# Patient Record
Sex: FEMALE | Race: WHITE | NOT HISPANIC OR LATINO | Employment: STUDENT | URBAN - METROPOLITAN AREA
[De-identification: names, ages, dates, MRNs, and addresses within clinical notes are randomized per-mention and may not be internally consistent; named-entity substitution may affect disease eponyms.]

---

## 2017-01-06 ENCOUNTER — GENERIC CONVERSION - ENCOUNTER (OUTPATIENT)
Dept: OTHER | Facility: OTHER | Age: 22
End: 2017-01-06

## 2017-01-20 ENCOUNTER — ALLSCRIPTS OFFICE VISIT (OUTPATIENT)
Dept: OTHER | Facility: OTHER | Age: 22
End: 2017-01-20

## 2017-01-20 DIAGNOSIS — D35.2 BENIGN NEOPLASM OF PITUITARY GLAND (HCC): ICD-10-CM

## 2017-04-10 ENCOUNTER — ALLSCRIPTS OFFICE VISIT (OUTPATIENT)
Dept: OTHER | Facility: OTHER | Age: 22
End: 2017-04-10

## 2017-04-10 DIAGNOSIS — E23.7 DISORDER OF PITUITARY GLAND (HCC): ICD-10-CM

## 2017-04-10 DIAGNOSIS — G50.0 TRIGEMINAL NEURALGIA: ICD-10-CM

## 2017-04-10 DIAGNOSIS — H57.11 PAIN OF RIGHT EYE: ICD-10-CM

## 2017-04-10 DIAGNOSIS — R20.0 ANESTHESIA OF SKIN: ICD-10-CM

## 2017-05-01 ENCOUNTER — HOSPITAL ENCOUNTER (OUTPATIENT)
Dept: RADIOLOGY | Facility: HOSPITAL | Age: 22
Discharge: HOME | End: 2017-05-01
Attending: PSYCHIATRY & NEUROLOGY
Payer: COMMERCIAL

## 2017-05-01 DIAGNOSIS — H57.11 PAIN OF RIGHT EYE: ICD-10-CM

## 2017-05-01 DIAGNOSIS — R20.0 ANESTHESIA OF SKIN: ICD-10-CM

## 2017-05-01 DIAGNOSIS — E23.7 DISORDER OF PITUITARY GLAND (HCC): ICD-10-CM

## 2017-05-01 DIAGNOSIS — G50.0 TRIGEMINAL NEURALGIA: ICD-10-CM

## 2017-05-01 PROCEDURE — 70544 MR ANGIOGRAPHY HEAD W/O DYE: CPT

## 2017-05-02 ENCOUNTER — GENERIC CONVERSION - ENCOUNTER (OUTPATIENT)
Dept: OTHER | Facility: OTHER | Age: 22
End: 2017-05-02

## 2017-06-29 ENCOUNTER — HOSPITAL ENCOUNTER (OUTPATIENT)
Dept: RADIOLOGY | Facility: HOSPITAL | Age: 22
Discharge: HOME | End: 2017-06-29
Payer: COMMERCIAL

## 2017-06-29 DIAGNOSIS — D35.2 BENIGN NEOPLASM OF PITUITARY GLAND (HCC): ICD-10-CM

## 2017-06-29 PROCEDURE — 70553 MRI BRAIN STEM W/O & W/DYE: CPT

## 2017-06-29 PROCEDURE — A9585 GADOBUTROL INJECTION: HCPCS | Performed by: RADIOLOGY

## 2017-06-29 RX ADMIN — GADOBUTROL 8 ML: 604.72 INJECTION INTRAVENOUS at 22:25

## 2017-07-03 DIAGNOSIS — D35.2 BENIGN NEOPLASM OF PITUITARY GLAND (HCC): ICD-10-CM

## 2018-01-11 NOTE — RESULT NOTES
Verified Results  * MRA AND OR MRV HEAD WO CONTRAST [id number] 08:04PM Silvina Lopez Order Number: [id number]    - Patient Instructions: To schedule this appointment, please contact Central Scheduling at [phone]  Test Name Result Flag Reference   MRA HEAD WO CONTRAST (Report)     This is a summary report  The complete report is available in the patient's medical record  If you cannot access the medical record, please contact the sending organization for a detailed fax or copy  MRA BRAIN     INDICATION: Trigeminal neuralgia on the right     COMPARISON: None  TECHNIQUE: Axial 3-D time-of-flight imaging with 3-D reconstructions  FINDINGS:     IMAGE QUALITY: Diagnostic  ANATOMY near 1 cm pituitary mass consistent with adenoma  INTERNAL CAROTID ARTERIES: Normal flow related enhancement of the distal cervical, petrous and cavernous segments of the internal carotid arteries  Normal ICA terminus  ANTERIOR CIRCULATION: Normal A1 segments  Normal anterior communicating artery  Normal flow-related enhancement of the anterior cerebral arteries  MIDDLE CEREBRAL ARTERY CIRCULATION: The M1 segment and middle cerebral artery branches demonstrate normal flow-related enhancement  DISTAL VERTEBRAL ARTERIES: Distal vertebral arteries are patient with a normal vertebrobasilar junction  The posterior inferior cerebellar artery origins are normal       BASILAR ARTERY: Normal      POSTERIOR CEREBRAL ARTERIES: Both posterior cerebral arteries arises from the basilar tip  Both arteries demonstrate normal flow-related enhancement  Normal posterior communicating arteries  IMPRESSION:     Normal MRA of the brain  There is no arterial pathology in the prepontine cistern left trigeminal nerve/Meckel's cave  9-10 mm Pituitary mass consistent with adenoma  Workstation performed: [id number]     Signed by:    Turner Rodriguez MD   5/2/17

## 2018-01-13 VITALS
TEMPERATURE: 98.2 F | HEIGHT: 68 IN | DIASTOLIC BLOOD PRESSURE: 67 MMHG | BODY MASS INDEX: 28.49 KG/M2 | WEIGHT: 188 LBS | SYSTOLIC BLOOD PRESSURE: 122 MMHG | RESPIRATION RATE: 14 BRPM | HEART RATE: 68 BPM

## 2018-01-13 VITALS
WEIGHT: 188 LBS | BODY MASS INDEX: 28.49 KG/M2 | SYSTOLIC BLOOD PRESSURE: 120 MMHG | HEART RATE: 84 BPM | RESPIRATION RATE: 12 BRPM | DIASTOLIC BLOOD PRESSURE: 68 MMHG | HEIGHT: 68 IN | OXYGEN SATURATION: 95 %

## 2018-01-13 NOTE — CONSULTS
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)    Plan  Pituitary macroadenoma    · (1) CORTISOL AM SPECIMEN; Status:Active; Requested MZM:[id number];    Perform:[facility]; IRP:[id number]; Ordered; For:Pituitary macroadenoma; Ordered By:Ernesto Abernathy;   · (1) HUMAN GROWTH HORMONE; Status:Active; Requested [id number];    Perform:[facility]; YHM:[id number]; Ordered; For:Pituitary macroadenoma; Ordered By:Ernesto Abernathy;   · * MRI BRAIN PITUITARY WO AND W CONTRAST; Status:Need Information - Financial  Authorization; Requested for:Approx [id number];    Perform:[facility]; Ordered; For:Pituitary macroadenoma; Ordered By:Ernesto Abernathy;   · Follow-up visit in 6 months Evaluation and Treatment  Follow-up, after MRI with pituitary  protocol, with Dr Meena Diallo  Status: Ashley Carlisle  Requested for: 2017   Ordered; For: Pituitary macroadenoma; Ordered By: Yeny Rivera Performed:  Due: [id number]    Discussion/Summary    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, 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, presents to review MRI brain with pituitary protocol, in addition visual field examination, and laboratory studies for pituitary macroadenoma are evaluated  This information and MRI was carefully reviewed by Dr Meena Diallo, the pituitary macroadenoma appears to have no impact on the optic chiasm at this time, and laboratory studies are all within normal limits although a HGH is missing and will be requested  Clinically she is asymptomatic at this time of this lesion, known focal neurologic deficits are appreciated on examination  These findings were reviewed with the patient, and follow-up is planned in 6 months with repeat MRI with the protocol, clinical consultation with Dr Meena Diallo, post imaging, and [facility] and a m  cortisol will be requested at this time (scrips were prepared and mailed to the patient)      In addition tentative options for management were briefly reviewed, these would include continue to follow with serial imaging, visual field, and laboratory studies, versus surgical excision at this time, versus SRS treatment  These options will be reviewed in detail by Dr Kaitlynn Bennett at the next clinical follow-up  The scrips for laboratory tests were mailed to her college residence:  [place] [facility], [address] [facility]    These findings, impressions and recommendations are reviewed in great detail with the patient, she expressed understanding and agreement, her questions were answered completely and to her satisfaction  Follow up has been scheduled, and imaging has been requested  The patient has the current Goals: Monitor pituitary macroadenoma  The patent has the current Barriers: None  Patient is able to Self-Care  Chief Complaint    1  Headache  Initial consultation, abnormal MRI, incidental finding pituitary microadenoma      History of Present Illness  Very pleasant 76-year-old female, 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, resents for evaluation as noted  She reports approximately one year ago she developed symptoms of numbness/tingling of the right side of her face which was very brief 1-2 seconds, and occur most days a few times  She reports that in September 2016 this increased in frequency and increased in duration and became more intense  As a consequence she underwent evaluation for this which included MRI of the brain, MRI cervical spine, MRI thoracic spine  Incidental note was made of a possible pituitary macroadenoma on both the MRI cervical spine as well as MRI of the brain  She has since had laboratory studies including cortisol, FSH, LH, prolactin, TSH and T4  She also underwent ophthalmology examination including visual fields, 1/6/17      MRI of the brain with without contrast was performed 1/10/17 2         JOHN GUARDADO presents with complaints of gradual onset of intermittent episodes of severe symmetrical, bilateral frontal and right occipital headache, described as dull, tight, aching, throbbing, knife-like and superficial, radiating to the right occipital area  On a scale of 1 to 10, the patient rates the pain as 7  Symptoms are improved by massage  Associated symptoms include tingling and tinnitus, but no nausea, no vomiting, no photophobia, no phonophobia, no weakness, no fever, no chills, no scalp tenderness, no vertigo, no diplopia, no vision loss and no confusion  numbness (to the right side of face)  Review of Systems    Constitutional: No fever, no chills, feels well, no tiredness, no recent weight gain or weight loss  Eyes: No complaints of eye pain, no red eyes, no eyesight problems, no discharge, no dry eyes, no itching of eyes  ENT: no complaints of earache, no loss of hearing, no nose bleeds, no nasal discharge, no sore throat, no hoarseness  Cardiovascular: No complaints of slow heart rate, no fast heart rate, no chest pain, no palpitations, no leg claudication, no lower extremity edema  Respiratory: No complaints of shortness of breath, no wheezing, no cough, no SOB on exertion, no orthopnea, no PND  Gastrointestinal: No complaints of abdominal pain, no constipation, no nausea or vomiting, no diarrhea, no bloody stools  Genitourinary: No complaints of dysuria, no incontinence, no pelvic pain, no dysmenorrhea, no vaginal discharge or bleeding  Musculoskeletal: No complaints of arthralgias, no myalgias, no joint swelling or stiffness, no limb pain or swelling  Integumentary: No complaints of skin rash or lesions, no itching, no skin wounds, no breast pain or lump  Neurological: headache, numbness and tingling, but as noted in HPI  Psychiatric: anxiety  Endocrine: No complaints of proptosis, no hot flashes, no muscle weakness, no deepening of the voice, no feelings of weakness     Hematologic/Lymphatic: No complaints of swollen glands, no swollen glands in the neck, does not bleed easily, does not bruise easily  ROS reviewed  Active Problems    1  Arm paresthesia, right (782 0) (R20 2)   2  Pain, eye, right (379 91) (H57 11)   3  Pituitary abnormality (253 9) (E23 7)   4  Right facial numbness (782 0) (R20 0)   5  Right leg paresthesias (782 0) (R20 2)    Past Medical History    1  History of Anxiety (300 00) (F41 9)   2  History of headache (V13 89) (Z87 898)    The active problems and past medical history were reviewed and updated today  Surgical History    The surgical history was reviewed and updated today  Family History  Father    1  Family history of essential hypertension (V17 49) (Z82 49)   2  Family history of HDL deficiency  Maternal Grandmother    3  Family history of cardiac disorder (V17 49) (Z82 49)   4  Family history of diabetes mellitus (V18 0) (Z83 3)  Paternal Grandmother    11  Family history of amyotrophic lateral sclerosis (V17 2) (Z82 0)  Paternal Grandfather    6  Family history of lung cancer (V16 1) (Z80 1)    The family history was reviewed and updated today  Social History    · Caffeine use (V49 89) (F15 90)   · Never a smoker   · No drug use   · Single   · Social alcohol use (Z78 9)   · Student  The social history was reviewed and updated today  Current Meds   1  Lexapro 10 MG Oral Tablet; take 1 tablet by mouth every day; Therapy: (Recorded:20Jan2017) to Recorded   2  RaNITidine HCl - 150 MG Oral Tablet; TAKE ONE TABLET BY MOUTH TWICE DAILY  AS   NEEDED; Therapy: (Recorded:19Qze1862) to Recorded   3  Sprintec 28 0 25-35 MG-MCG Oral Tablet; TAKE 1 TABLET DAILY; Therapy: (Recorded:02Dec2016) to Recorded   4  Vitamin D3 5000 UNIT Oral Capsule; Take One tablet by mouth in the morning; Therapy: (Recorded:02Dec2016) to Recorded    The medication list was reviewed and updated today  Allergies    1  Amoxicillin CAPS   2   Sulfa Drugs    Vitals  Vital Signs    Recorded: 20Jan2017 09: 36AM   Temperature 98 2 F   Heart Rate 68   Respiration 14   Systolic 420   Diastolic 67   Height 5 ft 8 in   Weight 188 lb    BMI Calculated 28 59   BSA Calculated 1 99     Physical Exam     Constitutional Patient appears healthy and well developed  No signs of acute distress present  appears healthy, comfortable, within normal limits of ideal weight and appearance reflects stated age  Respiratory Respiratory effort: Normal   Auscultation of lungs: Clear to auscultation bilaterally  Cardiovascular Auscultation of heart: Rate is regular  Rhythm is regular  No heart murmur appreciated  Neurologic - Mental Status: Alert and Oriented x3  Mood and affect: Affect is normal   Grossly nonfocal     Cranial Nerve Exam:  2nd cranial nerve: Visual field was full to confrontation  3rd, 4th, and 6th cranial nerves: Normal with no deficit  5th cranial nerve: Sensation was intact in all three divisions to light touch and temperature  Motor function was intact  7th cranial nerve: Face symmetrical at grimace and at rest  8th cranial nerve: Grossly intact to finger rub bilaterally  9th and 10th cranial nerves: Uvula is midline  11th cranial nerve: Shoulder shrug equal bilaterally  12th cranial nerve: Tongue mideline, no atrophy present  Motor System General Motor Strength: No pronator drift and no parietal drift  Reflexes: Biceps reflexes are 2+ bilaterally  Triceps reflexes are 2+ bilaterally  Achilles reflexes are 2+ bilaterally  Babinski's reflex is 2+ down going bilaterally  Ankle clonus is absent bilaterally  Vann sign was not present:   Coordination: Finger to nose was normal    Sensory: Sensation grossly intact to light touch  Sensation grossly intact to light touch  Gait and Station: [place] with a normal gait  Results/Data    Results Review 10/22/16:  Cortisol 24  FSH 4 9   LH 4 8  Prolactin 27 1  TSH 1 3 to  10/1/16  TSH 1 66  Free T4 1 0 05        Attending Note  Collaborating Note: I supervised the Advanced Practitioner and I agree with the Advanced Practitioner note  I discussed the case with the Advanced Practitioner and reviewed the AP note I agree with the Advanced Practitioner note except Imaging and lab results personally reviewed  Plan developed by me  Future Appointments    Date/Time Provider Specialty Site   02/24/2017 08:00 AM BERNARDINO Zarate   Neurology [address] [place] [address]     Signatures   Electronically signed by : CARMINE Hong; Jan 20 2017  4:40PM EST                       (Author)    Electronically signed by : Colette Rivero MD PhD; Jan 23 2017 12:51PM EST                       (Co-participant)

## 2018-01-14 NOTE — RESULT NOTES
Message   LET PT KNOW MRI T SPINE SHOWS NL CORD AND NO PINCHED NERVES  HOWEVER INCIDENTAL ARE CHRONIC SCHMORL'S NODES  SEE IF PT EVER SEEN BY ORTHO  WE CAN MAKE REFERRAL     Verified Results  * MRI CERVICAL SPINE W WO CONTRAST [id number] 04:32PM Johanna Brown Order Number: [id number]    - Patient Instructions: To schedule this appointment, please contact Central Scheduling at [phone]  Test Name Result Flag Reference   MRI CERVICAL SPINE W [address] (Report)     This is a summary report  The complete report is available in the patient's medical record  If you cannot access the medical record, please contact the sending organization for a detailed fax or copy  MRI CERVICAL SPINE WITH AND WITHOUT CONTRAST     INDICATION: Right arm and leg numbness and paresthesias for 3 months  COMPARISON: None  TECHNIQUE: Sagittal T1, sagittal T2, sagittal inversion recovery, axial 2D merge and axial T2  Sagittal T1 and axial T1 postcontrast     IV Contrast: gadobutrol injection (MULTI-DOSE) SOLN 7 mL Note: (SINGLE DOSE/MULTI DOSE) information refers to the container from which the contrast was acquired  Contrast was injected one time intravenously without immediate complication  IMAGE QUALITY: Diagnostic  FINDINGS:     ALIGNMENT: Normal alignment of the cervical spine  No compression fracture  No subluxation  No scoliosis  MARROW SIGNAL: Normal marrow signal is identified within the visualized bony structures  No discrete marrow lesion  CERVICAL AND VISUALIZED UPPER THORACIC CORD: Normal signal within the visualized cord  PREVERTEBRAL AND PARASPINAL SOFT TISSUES: Prevertebral and paraspinal soft tissues are unremarkable  VISUALIZED POSTERIOR FOSSA: The visualized posterior fossa is unremarkable  CERVICAL DISC SPACES: Mild cervical degenerative disc disease C3-4 through C6-7 without discrete disc herniation  No canal stenosis or foraminal narrowing   No cord or discrete nerve impingement  UPPER THORACIC DISC SPACES: Normal      POSTCONTRAST IMAGING: Normal      ADDITIONAL FINDINGS: Sagittal T1 weighted imaging demonstrates hyperintense T1 signal within the pituitary gland measuring 9 mm in AP diameter and approximately 9 mm in craniocaudad dimension  The gland appears slightly enlarged and convex superiorly  On T2-weighted imaging this abnormality is predominantly of low signal with some hyperintensity seen anteriorly  Findings are nonspecific and may represent a Rathke's cleft cyst with proteinaceous material or a chronic microadenoma  IMPRESSION:     There is mild cervical spondylitic degenerative change from C3-4 through C6-7, without disc herniation, canal stenosis or foraminal narrowing  Incidentally noted 9 mm focus of heterogeneous signal within a mildly enlarged pituitary gland  There is significant hyperintense signal identified within the gland on T1-weighted imaging, see series 4 image 8  This is suspicious for either pituitary    macroadenoma with proteinaceous material or complex Rathke's cleft cyst  Recommend dedicated MRI of the brain and pituitary gland, as well as pituitary function testing and neurosurgical consultation  ##sigslh##sigslh       Workstation performed: [id number]     Signed by:   Adriana Schmitz DO   12/19/16     * MRI THORACIC SPINE W WO CONTRAST [id number] 04:32PM Carol Still Order Number: [id number]    - Patient Instructions: To schedule this appointment, please contact Central Scheduling at [phone]  Test Name Result Flag Reference   MRI THORACIC SPINE W [address] (Report)     This is a summary report  The complete report is available in the patient's medical record  If you cannot access the medical record, please contact the sending organization for a detailed fax or copy       MRI THORACIC SPINE WITH AND WITHOUT CONTRAST     INDICATION: Right arm and leg numbness and paresthesias for 3 months  COMPARISON: None  TECHNIQUE: Sagittal T1, sagittal T2, sagittal inversion recovery, axial T2, axial 2D MERGE  Sagittal and axial T1 postcontrast      IV Contrast: gadobutrol injection (MULTI-DOSE) SOLN 7 mL Note: (SINGLE DOSE/MULTI DOSE) information refers to the container from which the contrast was acquired  Contrast was injected one time intravenously without immediate complication  IMAGE QUALITY: Diagnostic  FINDINGS:     ALIGNMENT: Normal alignment of the thoracic spine  No compression fracture  No subluxation  No evidence of scoliosis  MARROW SIGNAL: Normal marrow signal is identified within the visualized bony structures  No discrete marrow lesion  THORACIC CORD: Normal signal within the thoracic cord  PREVERTEBRAL AND PARASPINAL SOFT TISSUES: Prevertebral and paraspinal soft tissues are unremarkable  THORACIC DEGENERATIVE CHANGE: Chronic appearing endplate degenerative changes noted at multiple consecutive levels within the mid to lower thoracic spine  There is no discrete disc herniation, canal stenosis or foraminal narrowing  POSTCONTRAST: No abnormal enhancement  IMPRESSION:     Within the mid to lower thoracic spine there is multilevel endplate degenerative change which appears chronic suggesting chronic Schmorl's nodes  Findings may represent Scheuermann's disease  No disc herniation, canal stenosis or foraminal narrowing         Workstation performed: [id number]     Signed by:   Dee Lovell DO   12/19/16

## 2018-10-24 ENCOUNTER — HOSPITAL ENCOUNTER (EMERGENCY)
Facility: HOSPITAL | Age: 23
Discharge: HOME | End: 2018-10-24
Attending: EMERGENCY MEDICINE | Admitting: EMERGENCY MEDICINE
Payer: COMMERCIAL

## 2018-10-24 ENCOUNTER — APPOINTMENT (EMERGENCY)
Dept: ULTRASOUND IMAGING | Facility: HOSPITAL | Age: 23
End: 2018-10-24
Payer: COMMERCIAL

## 2018-10-24 VITALS
TEMPERATURE: 98.9 F | DIASTOLIC BLOOD PRESSURE: 86 MMHG | RESPIRATION RATE: 20 BRPM | SYSTOLIC BLOOD PRESSURE: 179 MMHG | OXYGEN SATURATION: 98 % | WEIGHT: 200 LBS | HEART RATE: 87 BPM | BODY MASS INDEX: 30.41 KG/M2

## 2018-10-24 DIAGNOSIS — J06.9 URI (UPPER RESPIRATORY INFECTION): ICD-10-CM

## 2018-10-24 DIAGNOSIS — M79.661 RIGHT CALF PAIN: Primary | ICD-10-CM

## 2018-10-24 LAB — DEPRECATED D DIMER PPP: 391 NG/ML (FEU) (ref 0–424)

## 2018-10-24 PROCEDURE — 93971 EXTREMITY STUDY: CPT

## 2018-10-24 PROCEDURE — 99284 EMERGENCY DEPT VISIT MOD MDM: CPT

## 2018-10-24 PROCEDURE — 36415 COLL VENOUS BLD VENIPUNCTURE: CPT | Performed by: PHYSICIAN ASSISTANT

## 2018-10-24 PROCEDURE — 85379 FIBRIN DEGRADATION QUANT: CPT | Performed by: PHYSICIAN ASSISTANT

## 2018-10-24 NOTE — DISCHARGE INSTRUCTIONS
Upper Respiratory Infection   WHAT YOU NEED TO KNOW:   An upper respiratory infection is also called the common cold  It is an infection that can affect your nose, throat, ears, and sinuses  For healthy people, the common cold is usually not serious and does not need special treatment  Cold symptoms are usually worst for the first 3 to 5 days  Most people get better in 7 to 14 days  You may continue to cough for 2 to 3 weeks  Colds are caused by viruses and do not get better with antibiotics  DISCHARGE INSTRUCTIONS:   Return to the emergency department if:   · You have chest pain or trouble breathing  Contact your healthcare provider if:   · You have a fever over 102ºF (39°C)  · Your sore throat gets worse or you see white or yellow spots in your throat  · Your symptoms get worse after 3 to 5 days or your cold is not better in 14 days  · You have a rash anywhere on your skin  · You have large, tender lumps in your neck  · You have thick, green or yellow drainage from your nose  · You cough up thick yellow, green, or bloody mucus  · You have vomiting for more than 24 hours and cannot keep fluids down  · You have a bad earache  · You have questions or concerns about your condition or care  Medicines: You may need any of the following:  · Decongestants  help reduce nasal congestion and help you breathe more easily  If you take decongestant pills, they may make you feel restless or cause problems with your sleep  Do not use decongestant sprays for more than a few days  · Cough suppressants  help reduce coughing  Ask your healthcare provider which type of cough medicine is best for you  · NSAIDs , such as ibuprofen, help decrease swelling, pain, and fever  NSAIDs can cause stomach bleeding or kidney problems in certain people  If you take blood thinner medicine, always ask your healthcare provider if NSAIDs are safe for you   Always read the medicine label and follow directions  · Acetaminophen  decreases pain and fever  It is available without a doctor's order  Ask how much to take and how often to take it  Follow directions  Read the labels of all other medicines you are using to see if they also contain acetaminophen, or ask your doctor or pharmacist  Acetaminophen can cause liver damage if not taken correctly  Do not use more than 4 grams (4,000 milligrams) total of acetaminophen in one day  · Take your medicine as directed  Contact your healthcare provider if you think your medicine is not helping or if you have side effects  Tell him or her if you are allergic to any medicine  Keep a list of the medicines, vitamins, and herbs you take  Include the amounts, and when and why you take them  Bring the list or the pill bottles to follow-up visits  Carry your medicine list with you in case of an emergency  Follow up with your healthcare provider as directed:  Write down your questions so you remember to ask them during your visits  Self-care:   · Rest as much as possible  Slowly start to do more each day  · Drink more liquids as directed  Liquids will help thin and loosen mucus so you can cough it up  Liquids will also help prevent dehydration  Liquids that help prevent dehydration include water, fruit juice, and broth  Do not drink liquids that contain caffeine  Caffeine can increase your risk for dehydration  Ask your healthcare provider how much liquid to drink each day  · Soothe a sore throat  Gargle with warm salt water  This helps your sore throat feel better  Make salt water by dissolving ¼ teaspoon salt in 1 cup warm water  You may also suck on hard candy or throat lozenges  You may use a sore throat spray  · Use a humidifier or vaporizer  Use a cool mist humidifier or a vaporizer to increase air moisture in your home  This may make it easier for you to breathe and help decrease your cough  · Use saline nasal drops as directed    These help relieve congestion  · Apply petroleum-based jelly around the outside of your nostrils  This can decrease irritation from blowing your nose  · Do not smoke  Nicotine and other chemicals in cigarettes and cigars can make your symptoms worse  They can also cause infections such as bronchitis or pneumonia  Ask your healthcare provider for information if you currently smoke and need help to quit  E-cigarettes or smokeless tobacco still contain nicotine  Talk to your healthcare provider before you use these products  Prevent spreading your cold to others:   · Try to stay away from other people during the first 2 to 3 days of your cold when it is more easily spread  · Do not share food or drinks  · Do not share hand towels with household members  · Wash your hands often, especially after you blow your nose  Turn away from other people and cover your mouth and nose with a tissue when you sneeze or cough  © 2017 [employer]  Information is for End User's use only and may not be sold, redistributed or otherwise used for commercial purposes  All illustrations and images included in CareNotes® are the copyrighted property of [employer]  or [employer]  The above information is an  only  It is not intended as medical advice for individual conditions or treatments  Talk to your doctor, nurse or pharmacist before following any medical regimen to see if it is safe and effective for you  Muscle Strain   WHAT YOU NEED TO KNOW:   A muscle strain is a twist, pull, or tear of a muscle or tendon  A tendon is a strong elastic tissue that connects a muscle to a bone  Signs of a strained muscle include bruising and swelling over the area, pain with movement, and loss of strength  DISCHARGE INSTRUCTIONS:   Return to the emergency department if:   · You suddenly cannot feel or move your injured muscle      Contact your healthcare provider if:   · Your pain and swelling worsen or do not go away  · You have questions or concerns about your condition or care  Medicines:   · NSAIDs  help decrease swelling and pain or fever  This medicine is available with or without a doctor's order  NSAIDs can cause stomach bleeding or kidney problems in certain people  If you take blood thinner medicine, always ask your healthcare provider if NSAIDs are safe for you  Always read the medicine label and follow directions  · Muscle relaxers  help decrease pain and muscle spasms  · Take your medicine as directed  Contact your healthcare provider if you think your medicine is not helping or if you have side effects  Tell him of her if you are allergic to any medicine  Keep a list of the medicines, vitamins, and herbs you take  Include the amounts, and when and why you take them  Bring the list or the pill bottles to follow-up visits  Carry your medicine list with you in case of an emergency  Follow up with your healthcare provider as directed: Your healthcare provider may suggest that you have a follow-up visit before you go back to your usual activity  Write down your questions so you remember to ask them during your visits  Self-care:   · 3 to 7 days after the injury:  Use Rest, Ice, Compression, and Elevation (RICE) to help stop bruising and decrease pain and swelling  ¨ Rest:  Rest your muscle to allow your injury to heal  When the pain decreases, begin normal, slow movements  For mild and moderate muscle strains, you should rest your muscles for about 2 days  However, if you have a severe muscle strain, you should rest for 10 to 14 days  You may need to use crutches to walk if your muscle strain is in your legs or lower body  ¨ Ice:  Put an ice pack on the injured area  Put a towel between the ice pack and your skin  Do not put the ice pack directly on your skin  You can use a package of frozen peas instead of an ice pack      ¨ Compression:  You may need to wrap an elastic bandage around the area to decrease swelling  It should be tight enough for you to feel support  Do not wrap it too tightly  ¨ Elevation:  Keep the injured muscle raised above your heart if possible  For example if you have a strain of your lower leg muscle, lie down and prop your leg up on pillows  This helps decrease pain and swelling  · 3 to 21 days after the injury:  Start to slowly and regularly exercise your muscle  This will help it heal  If you feel pain, decrease how hard you are exercising  · 1 to 6 weeks after the injury:  Stretch the injured muscle  Hold the stretch for about 30 seconds  Do this 4 times a day  You may stretch the muscle until you feel a slight pull  Stop stretching if you feel pain  · 2 weeks to 6 months after the injury:  The goal of this phase is to return to the activity you were doing before the injury happened, without hurting the muscle again  · 3 weeks to 6 months after the injury:  Keep stretching and strengthening your muscles to avoid injury  Slowly increase the time and distance that you exercise  You may have signs and symptoms of muscle strain 6 months after the injury, even if you do things to help it heal  In this case, you may need surgery on the muscle  © 2017 [employer]  Information is for End User's use only and may not be sold, redistributed or otherwise used for commercial purposes  All illustrations and images included in CareNotes® are the copyrighted property of [employer]  or [employer]  The above information is an  only  It is not intended as medical advice for individual conditions or treatments  Talk to your doctor, nurse or pharmacist before following any medical regimen to see if it is safe and effective for you

## 2018-10-24 NOTE — ED NOTES
Discharge instruction given to patient  Follow-up care encouraged  Patient had no questions or concerns  Patient able to ambulate out without difficulty        Jh Ricks RN  10/24/18 9320

## 2018-10-24 NOTE — ED PROVIDER NOTES
History  Chief Complaint   Patient presents with    Leg Pain     Patient c/o pain in right calf that started after she returned from [facility] last week  Patient concerned because she does take birth control  Describes pain as "tight " Also stated she had a bad fall on 10/14 on that leg, but wants to r/o blood clot  21-year-old female presents emergency room for evaluation of right calf pain  Onset 4 days ago after returning from [facility]  Patient states 10 days ago she had a fall bruising her right knee  States she is unsure if this pain it is from this, however is concerned about a blood clot as she is taking oral contraceptive pills  Patient also states for the past 6 days she has been fighting an upper respiratory infection  States she has some nasal congestion and productive cough with soreness in her chest   Denies dizziness or syncope  Denies significant shortness of breath  She is taking Mucinex for this  History provided by:  Patient      None       Past Medical History:   Diagnosis Date    Benign tumor of pituitary gland ([facility] [address] )        History reviewed  No pertinent surgical history  History reviewed  No pertinent family history  I have reviewed and agree with the history as documented  Social History   Substance Use Topics    Smoking status: Never Smoker    Smokeless tobacco: Not on file    Alcohol use No        Review of Systems   Constitutional: Negative for chills and fever  HENT: Positive for congestion and rhinorrhea  Negative for ear pain and sore throat  Respiratory: Positive for cough  Negative for shortness of breath  Cardiovascular: Negative for chest pain  Musculoskeletal: Negative for back pain  Skin: Negative for rash and wound  Physical Exam  Physical Exam   Constitutional: She appears well-developed and well-nourished     HENT:   Right Ear: Tympanic membrane and external ear normal    Left Ear: Tympanic membrane and external ear normal    Nose: Mucosal edema and rhinorrhea present  Mouth/Throat: Uvula is midline, oropharynx is clear and moist and mucous membranes are normal  No tonsillar exudate  Eyes: Conjunctivae are normal    Neck: Neck supple  Cardiovascular: Normal rate, regular rhythm and normal heart sounds  Pulmonary/Chest: Effort normal and breath sounds normal    Musculoskeletal:        Right knee: She exhibits ecchymosis  She exhibits normal range of motion, no swelling, no effusion, no deformity, no erythema, no LCL laxity, normal patellar mobility, normal meniscus and no MCL laxity  Tenderness found  Right ankle: Normal         Right upper leg: She exhibits no bony tenderness, no swelling and no deformity  Right lower leg: She exhibits tenderness  She exhibits no bony tenderness, no swelling, no edema, no deformity and no laceration  Legs:  Lymphadenopathy:     She has no cervical adenopathy  Neurological: She is alert  Skin: Skin is warm and dry  No rash noted  Nursing note and vitals reviewed        Vital Signs  ED Triage Vitals   Temperature Pulse Respirations Blood Pressure SpO2   10/24/18 1546 10/24/18 1543 10/24/18 1543 10/24/18 1543 10/24/18 1543   98 9 °F (37 2 °C) 87 20 (!) 179/86 98 %      Temp Source Heart Rate Source Patient Position - Orthostatic VS BP Location FiO2 (%)   10/24/18 1546 10/24/18 1543 10/24/18 1543 10/24/18 1543 --   Oral Monitor Sitting Left arm       Pain Score       10/24/18 1543       8           Vitals:    10/24/18 1543   BP: (!) 179/86   Pulse: 87   Patient Position - Orthostatic VS: Sitting       Visual Acuity      ED Medications  Medications - No data to display    Diagnostic Studies  Results Reviewed     Procedure Component Value Units Date/Time    D-Dimer [[id number]]  (Normal) Collected:  10/24/18 1627    Lab Status:  Final result Specimen:  Blood from Arm, Right Updated:  10/24/18 1653     D-Dimer, Quant 391 ng/ml (FEU)                  VAS lower limb venous duplex study, unilateral/limited    (Results Pending)              Procedures  Procedures       Phone Contacts  ED Phone Contact    ED Course               PERC Rule for PE      Most Recent Value   PERC Rule for PE   Age >=50  0 Filed at: 10/24/2018 1612   HR >=100  0 Filed at: 10/24/2018 1612   O2 Sat on room air < 95%  0 Filed at: 10/24/2018 1612   History of PE or DVT  0 Filed at: 10/24/2018 1612   Recent trauma or surgery  0 Filed at: 10/24/2018 1612   Hemoptysis  0 Filed at: 10/24/2018 1612   Exogenous estrogen  1 Filed at: 10/24/2018 1612   Unilateral leg swelling  0 Filed at: 10/24/2018 1612   PERC Rule for PE Results  1 Filed at: 10/24/2018 1612                Wells' Criteria for PE      Most Recent Value   Wells' Criteria for PE   Clinical signs and symptoms of DVT  3 Filed at: 10/24/2018 1611   PE is primary diagnosis or equally likely  3 Filed at: 10/24/2018 1611   HR >100  0 Filed at: 10/24/2018 1611   Immobilization at least 3 days or Surgery in the previous 4 weeks  0 Filed at: 10/24/2018 1611   Previous, objectively diagnosed PE or DVT  0 Filed at: 10/24/2018 1611   Hemoptysis  0 Filed at: 10/24/2018 1611   Malignancy with treatment within 6 months or palliative  0 Filed at: 10/24/2018 1611   Wells' Criteria Total  6 Filed at: 10/24/2018 1611            MDM  Number of Diagnoses or Management Options  Right calf pain:   URI (upper respiratory infection):      Amount and/or Complexity of Data Reviewed  Clinical lab tests: ordered and reviewed  Tests in the radiology section of CPT®: ordered and reviewed (Right lower extremity vascular study venous duplex negative for DVT - per tech )  Discuss the patient with other providers: yes    Risk of Complications, Morbidity, and/or Mortality  General comments: Differential diagnosis includes but is not limited to:   DVT, PE, muscle strain, upper respiratory infection, sinusitis, bronchitis    Patient Progress  Patient progress: stable    CritCare Time    Disposition  Final diagnoses:   Right calf pain   URI (upper respiratory infection)     Time reflects when diagnosis was documented in both MDM as applicable and the Disposition within this note     Time User Action Codes Description Comment    10/24/2018  5:14 PM Dmitrymarilyn Bear Add [Z69 244] Right calf pain     10/24/2018  5:14 PM Abhishek Rivera SAEID Add [J06 9] URI (upper respiratory infection)       ED Disposition     ED Disposition Condition Comment    Discharge  Anay Eargodfrey discharge to home/self care  Condition at discharge: Good        Follow-up Information     Follow up With Specialties Details Why Contact Info Additional Oseas Clemente MD Family Medicine In 3 days  [address]  [address] [facility] Emergency Department Emergency Medicine  If symptoms worsen [address] [facility]  AN ED, [address], [place], [place], [address]          There are no discharge medications for this patient  No discharge procedures on file      ED Provider  Electronically Signed by           Meghana Richards PA-C  10/24/18 2230

## 2018-10-25 PROCEDURE — 93971 EXTREMITY STUDY: CPT | Performed by: SURGERY

## 2019-11-26 ENCOUNTER — TRANSCRIPTION ENCOUNTER (OUTPATIENT)
Age: 24
End: 2019-11-26

## 2019-11-26 ENCOUNTER — APPOINTMENT (OUTPATIENT)
Dept: DERMATOLOGY | Facility: CLINIC | Age: 24
End: 2019-11-26
Payer: COMMERCIAL

## 2019-11-26 VITALS — WEIGHT: 185 LBS | BODY MASS INDEX: 28.04 KG/M2 | HEIGHT: 68 IN

## 2019-11-26 DIAGNOSIS — L56.2: ICD-10-CM

## 2019-11-26 DIAGNOSIS — Z78.9 OTHER SPECIFIED HEALTH STATUS: ICD-10-CM

## 2019-11-26 DIAGNOSIS — Z91.89 OTHER SPECIFIED PERSONAL RISK FACTORS, NOT ELSEWHERE CLASSIFIED: ICD-10-CM

## 2019-11-26 DIAGNOSIS — L81.0 POSTINFLAMMATORY HYPERPIGMENTATION: ICD-10-CM

## 2019-11-26 PROBLEM — Z00.00 ENCOUNTER FOR PREVENTIVE HEALTH EXAMINATION: Status: ACTIVE | Noted: 2019-11-26

## 2019-11-26 PROCEDURE — 99213 OFFICE O/P EST LOW 20 MIN: CPT

## 2019-11-26 RX ORDER — ESCITALOPRAM OXALATE 5 MG/1
TABLET, FILM COATED ORAL
Refills: 0 | Status: ACTIVE | COMMUNITY

## 2019-11-26 RX ORDER — NORGESTIMATE AND ETHINYL ESTRADIOL 7DAYSX3 28
KIT ORAL
Refills: 0 | Status: ACTIVE | COMMUNITY

## 2019-11-26 RX ORDER — OMEPRAZOLE 10 MG/1
10 CAPSULE, DELAYED RELEASE ORAL
Refills: 0 | Status: ACTIVE | COMMUNITY

## 2019-11-26 RX ORDER — FAMOTIDINE 40 MG/1
TABLET, FILM COATED ORAL
Refills: 0 | Status: ACTIVE | COMMUNITY

## 2019-11-26 NOTE — PHYSICAL EXAM
[Alert] : alert [Oriented x 3] : ~L oriented x 3 [Face] : Face [Nose] : Nose [Eyelids] : Eyelids [Ears] : Ears [Lips] : Lips [FreeTextEntry3] : Right volar wrist: Uniformly brown/with a focal linear tail\par Right medial leg: Broad brown linear splotch, darker proximally

## 2019-11-26 NOTE — ASSESSMENT
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

## 2019-11-26 NOTE — HISTORY OF PRESENT ILLNESS
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  Patient was on a cruise to the [facility] prior to onset.  Patient had been drinking alcoholic drinks with limes.

## 2020-09-22 ENCOUNTER — TRANSCRIPTION ENCOUNTER (OUTPATIENT)
Age: 25
End: 2020-09-22

## 2020-09-23 ENCOUNTER — APPOINTMENT (OUTPATIENT)
Dept: DERMATOLOGY | Facility: CLINIC | Age: 25
End: 2020-09-23

## 2020-09-29 ENCOUNTER — APPOINTMENT (OUTPATIENT)
Dept: DERMATOLOGY | Facility: CLINIC | Age: 25
End: 2020-09-29